# Patient Record
Sex: MALE | Race: WHITE | NOT HISPANIC OR LATINO
[De-identification: names, ages, dates, MRNs, and addresses within clinical notes are randomized per-mention and may not be internally consistent; named-entity substitution may affect disease eponyms.]

---

## 2017-04-24 ENCOUNTER — RX RENEWAL (OUTPATIENT)
Age: 32
End: 2017-04-24

## 2017-04-24 DIAGNOSIS — J30.2 OTHER SEASONAL ALLERGIC RHINITIS: ICD-10-CM

## 2019-02-01 ENCOUNTER — OUTPATIENT (OUTPATIENT)
Dept: OUTPATIENT SERVICES | Facility: HOSPITAL | Age: 34
LOS: 1 days | End: 2019-02-01
Payer: MEDICAID

## 2019-02-01 PROCEDURE — G9001: CPT

## 2019-02-05 ENCOUNTER — INPATIENT (INPATIENT)
Facility: HOSPITAL | Age: 34
LOS: 1 days | Discharge: HOME | End: 2019-02-07
Attending: SURGERY | Admitting: SURGERY
Payer: COMMERCIAL

## 2019-02-05 VITALS
HEART RATE: 110 BPM | RESPIRATION RATE: 16 BRPM | TEMPERATURE: 97 F | DIASTOLIC BLOOD PRESSURE: 78 MMHG | OXYGEN SATURATION: 99 % | WEIGHT: 139.99 LBS | SYSTOLIC BLOOD PRESSURE: 133 MMHG

## 2019-02-05 PROCEDURE — 99291 CRITICAL CARE FIRST HOUR: CPT

## 2019-02-05 PROCEDURE — 72131 CT LUMBAR SPINE W/O DYE: CPT | Mod: 26

## 2019-02-05 RX ORDER — LIDOCAINE 4 G/100G
1 CREAM TOPICAL ONCE
Qty: 0 | Refills: 0 | Status: COMPLETED | OUTPATIENT
Start: 2019-02-05 | End: 2019-02-05

## 2019-02-05 RX ORDER — KETOROLAC TROMETHAMINE 30 MG/ML
30 SYRINGE (ML) INJECTION ONCE
Qty: 0 | Refills: 0 | Status: DISCONTINUED | OUTPATIENT
Start: 2019-02-05 | End: 2019-02-05

## 2019-02-05 RX ADMIN — Medication 30 MILLIGRAM(S): at 22:46

## 2019-02-05 RX ADMIN — Medication 30 MILLIGRAM(S): at 23:15

## 2019-02-05 RX ADMIN — LIDOCAINE 1 PATCH: 4 CREAM TOPICAL at 23:12

## 2019-02-05 NOTE — ED ADULT NURSE NOTE - NSIMPLEMENTINTERV_GEN_ALL_ED
Implemented All Universal Safety Interventions:  [place] to call system. Call bell, personal items and telephone within reach. Instruct patient to call for assistance. Room bathroom lighting operational. Non-slip footwear when patient is off stretcher. Physically safe environment: no spills, clutter or unnecessary equipment. Stretcher in lowest position, wheels locked, appropriate side rails in place.

## 2019-02-05 NOTE — ED PROVIDER NOTE - CRITICAL CARE PROVIDED
interpretation of diagnostic studies/documentation/consultation with other physicians/direct patient care (not related to procedure)
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
## 2019-02-05 NOTE — ED PROVIDER NOTE - CONSTITUTIONAL, MLM
normal... Adult white male.  Well appearing, well nourished, awake, alert, oriented to person, place, time/situation and in mild distress from pain.
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
## 2019-02-06 DIAGNOSIS — F19.10 OTHER PSYCHOACTIVE SUBSTANCE ABUSE, UNCOMPLICATED: ICD-10-CM

## 2019-02-06 DIAGNOSIS — Z90.49 ACQUIRED ABSENCE OF OTHER SPECIFIED PARTS OF DIGESTIVE TRACT: Chronic | ICD-10-CM

## 2019-02-06 DIAGNOSIS — S32.001A STABLE BURST FRACTURE OF UNSPECIFIED LUMBAR VERTEBRA, INITIAL ENCOUNTER FOR CLOSED FRACTURE: ICD-10-CM

## 2019-02-06 LAB
ALBUMIN SERPL ELPH-MCNC: 3.7 G/DL — SIGNIFICANT CHANGE UP (ref 3.3–5)
ALP SERPL-CCNC: 51 U/L — SIGNIFICANT CHANGE UP (ref 40–120)
ALT FLD-CCNC: 22 U/L — SIGNIFICANT CHANGE UP (ref 12–78)
ANION GAP SERPL CALC-SCNC: 8 MMOL/L — SIGNIFICANT CHANGE UP (ref 5–17)
APTT BLD: 30.4 SEC — SIGNIFICANT CHANGE UP (ref 27.5–36.3)
AST SERPL-CCNC: 18 U/L — SIGNIFICANT CHANGE UP (ref 15–37)
BASOPHILS # BLD AUTO: 0.01 K/UL — SIGNIFICANT CHANGE UP (ref 0–0.2)
BASOPHILS NFR BLD AUTO: 0.1 % — SIGNIFICANT CHANGE UP (ref 0–2)
BILIRUB SERPL-MCNC: 0.3 MG/DL — SIGNIFICANT CHANGE UP (ref 0.2–1.2)
BLD GP AB SCN SERPL QL: SIGNIFICANT CHANGE UP
BUN SERPL-MCNC: 15 MG/DL — SIGNIFICANT CHANGE UP (ref 7–23)
CALCIUM SERPL-MCNC: 8.5 MG/DL — SIGNIFICANT CHANGE UP (ref 8.5–10.1)
CHLORIDE SERPL-SCNC: 112 MMOL/L — HIGH (ref 96–108)
CO2 SERPL-SCNC: 25 MMOL/L — SIGNIFICANT CHANGE UP (ref 22–31)
CREAT SERPL-MCNC: 0.98 MG/DL — SIGNIFICANT CHANGE UP (ref 0.5–1.3)
EOSINOPHIL # BLD AUTO: 0.08 K/UL — SIGNIFICANT CHANGE UP (ref 0–0.5)
EOSINOPHIL NFR BLD AUTO: 0.9 % — SIGNIFICANT CHANGE UP (ref 0–6)
GLUCOSE SERPL-MCNC: 107 MG/DL — HIGH (ref 70–99)
HCT VFR BLD CALC: 38.1 % — LOW (ref 39–50)
HGB BLD-MCNC: 12.9 G/DL — LOW (ref 13–17)
IMM GRANULOCYTES NFR BLD AUTO: 0.4 % — SIGNIFICANT CHANGE UP (ref 0–1.5)
INR BLD: 1.27 RATIO — HIGH (ref 0.88–1.16)
LYMPHOCYTES # BLD AUTO: 1.24 K/UL — SIGNIFICANT CHANGE UP (ref 1–3.3)
LYMPHOCYTES # BLD AUTO: 13.9 % — SIGNIFICANT CHANGE UP (ref 13–44)
MCHC RBC-ENTMCNC: 31.3 PG — SIGNIFICANT CHANGE UP (ref 27–34)
MCHC RBC-ENTMCNC: 33.9 GM/DL — SIGNIFICANT CHANGE UP (ref 32–36)
MCV RBC AUTO: 92.5 FL — SIGNIFICANT CHANGE UP (ref 80–100)
MONOCYTES # BLD AUTO: 0.52 K/UL — SIGNIFICANT CHANGE UP (ref 0–0.9)
MONOCYTES NFR BLD AUTO: 5.8 % — SIGNIFICANT CHANGE UP (ref 2–14)
NEUTROPHILS # BLD AUTO: 7.06 K/UL — SIGNIFICANT CHANGE UP (ref 1.8–7.4)
NEUTROPHILS NFR BLD AUTO: 78.9 % — HIGH (ref 43–77)
NRBC # BLD: 0 /100 WBCS — SIGNIFICANT CHANGE UP (ref 0–0)
PLATELET # BLD AUTO: 246 K/UL — SIGNIFICANT CHANGE UP (ref 150–400)
POTASSIUM SERPL-MCNC: 3.2 MMOL/L — LOW (ref 3.5–5.3)
POTASSIUM SERPL-SCNC: 3.2 MMOL/L — LOW (ref 3.5–5.3)
PROT SERPL-MCNC: 6.6 GM/DL — SIGNIFICANT CHANGE UP (ref 6–8.3)
PROTHROM AB SERPL-ACNC: 14.2 SEC — HIGH (ref 10–12.9)
RBC # BLD: 4.12 M/UL — LOW (ref 4.2–5.8)
RBC # FLD: 12.3 % — SIGNIFICANT CHANGE UP (ref 10.3–14.5)
SODIUM SERPL-SCNC: 145 MMOL/L — SIGNIFICANT CHANGE UP (ref 135–145)
TYPE + AB SCN PNL BLD: SIGNIFICANT CHANGE UP
WBC # BLD: 8.95 K/UL — SIGNIFICANT CHANGE UP (ref 3.8–10.5)
WBC # FLD AUTO: 8.95 K/UL — SIGNIFICANT CHANGE UP (ref 3.8–10.5)

## 2019-02-06 PROCEDURE — 74177 CT ABD & PELVIS W/CONTRAST: CPT | Mod: 26

## 2019-02-06 PROCEDURE — 99252 IP/OBS CONSLTJ NEW/EST SF 35: CPT

## 2019-02-06 PROCEDURE — 72148 MRI LUMBAR SPINE W/O DYE: CPT | Mod: 26

## 2019-02-06 PROCEDURE — 99223 1ST HOSP IP/OBS HIGH 75: CPT

## 2019-02-06 PROCEDURE — 71260 CT THORAX DX C+: CPT | Mod: 26

## 2019-02-06 RX ORDER — IBUPROFEN 200 MG
400 TABLET ORAL EVERY 4 HOURS
Qty: 0 | Refills: 0 | Status: DISCONTINUED | OUTPATIENT
Start: 2019-02-06 | End: 2019-02-07

## 2019-02-06 RX ORDER — HYDROMORPHONE HYDROCHLORIDE 2 MG/ML
1 INJECTION INTRAMUSCULAR; INTRAVENOUS; SUBCUTANEOUS EVERY 4 HOURS
Qty: 0 | Refills: 0 | Status: DISCONTINUED | OUTPATIENT
Start: 2019-02-06 | End: 2019-02-06

## 2019-02-06 RX ORDER — PANTOPRAZOLE SODIUM 20 MG/1
40 TABLET, DELAYED RELEASE ORAL DAILY
Qty: 0 | Refills: 0 | Status: DISCONTINUED | OUTPATIENT
Start: 2019-02-06 | End: 2019-02-07

## 2019-02-06 RX ORDER — SODIUM CHLORIDE 9 MG/ML
3 INJECTION INTRAMUSCULAR; INTRAVENOUS; SUBCUTANEOUS EVERY 8 HOURS
Qty: 0 | Refills: 0 | Status: DISCONTINUED | OUTPATIENT
Start: 2019-02-06 | End: 2019-02-07

## 2019-02-06 RX ORDER — SODIUM CHLORIDE 9 MG/ML
3 INJECTION INTRAMUSCULAR; INTRAVENOUS; SUBCUTANEOUS ONCE
Qty: 0 | Refills: 0 | Status: COMPLETED | OUTPATIENT
Start: 2019-02-06 | End: 2019-02-06

## 2019-02-06 RX ORDER — SODIUM CHLORIDE 9 MG/ML
1000 INJECTION INTRAMUSCULAR; INTRAVENOUS; SUBCUTANEOUS
Qty: 0 | Refills: 0 | Status: DISCONTINUED | OUTPATIENT
Start: 2019-02-06 | End: 2019-02-06

## 2019-02-06 RX ORDER — ACETAMINOPHEN 500 MG
1000 TABLET ORAL ONCE
Qty: 0 | Refills: 0 | Status: COMPLETED | OUTPATIENT
Start: 2019-02-06 | End: 2019-02-06

## 2019-02-06 RX ORDER — MORPHINE SULFATE 50 MG/1
6 CAPSULE, EXTENDED RELEASE ORAL ONCE
Qty: 0 | Refills: 0 | Status: DISCONTINUED | OUTPATIENT
Start: 2019-02-06 | End: 2019-02-06

## 2019-02-06 RX ORDER — HEPARIN SODIUM 5000 [USP'U]/ML
5000 INJECTION INTRAVENOUS; SUBCUTANEOUS EVERY 8 HOURS
Qty: 0 | Refills: 0 | Status: DISCONTINUED | OUTPATIENT
Start: 2019-02-06 | End: 2019-02-07

## 2019-02-06 RX ORDER — BUPRENORPHINE AND NALOXONE 2; .5 MG/1; MG/1
0 TABLET SUBLINGUAL
Qty: 0 | Refills: 0 | COMMUNITY

## 2019-02-06 RX ADMIN — SODIUM CHLORIDE 3 MILLILITER(S): 9 INJECTION INTRAMUSCULAR; INTRAVENOUS; SUBCUTANEOUS at 00:34

## 2019-02-06 RX ADMIN — MORPHINE SULFATE 6 MILLIGRAM(S): 50 CAPSULE, EXTENDED RELEASE ORAL at 00:45

## 2019-02-06 RX ADMIN — Medication 400 MILLIGRAM(S): at 18:07

## 2019-02-06 RX ADMIN — HYDROMORPHONE HYDROCHLORIDE 1 MILLIGRAM(S): 2 INJECTION INTRAMUSCULAR; INTRAVENOUS; SUBCUTANEOUS at 02:41

## 2019-02-06 RX ADMIN — Medication 400 MILLIGRAM(S): at 15:10

## 2019-02-06 RX ADMIN — MORPHINE SULFATE 6 MILLIGRAM(S): 50 CAPSULE, EXTENDED RELEASE ORAL at 00:22

## 2019-02-06 RX ADMIN — Medication 400 MILLIGRAM(S): at 21:42

## 2019-02-06 RX ADMIN — HYDROMORPHONE HYDROCHLORIDE 1 MILLIGRAM(S): 2 INJECTION INTRAMUSCULAR; INTRAVENOUS; SUBCUTANEOUS at 07:47

## 2019-02-06 RX ADMIN — HEPARIN SODIUM 5000 UNIT(S): 5000 INJECTION INTRAVENOUS; SUBCUTANEOUS at 21:42

## 2019-02-06 RX ADMIN — SODIUM CHLORIDE 3 MILLILITER(S): 9 INJECTION INTRAMUSCULAR; INTRAVENOUS; SUBCUTANEOUS at 14:51

## 2019-02-06 RX ADMIN — SODIUM CHLORIDE 3 MILLILITER(S): 9 INJECTION INTRAMUSCULAR; INTRAVENOUS; SUBCUTANEOUS at 21:06

## 2019-02-06 RX ADMIN — LIDOCAINE 1 PATCH: 4 CREAM TOPICAL at 16:10

## 2019-02-06 RX ADMIN — LIDOCAINE 1 PATCH: 4 CREAM TOPICAL at 08:00

## 2019-02-06 RX ADMIN — Medication 400 MILLIGRAM(S): at 18:10

## 2019-02-06 RX ADMIN — Medication 1000 MILLIGRAM(S): at 12:00

## 2019-02-06 RX ADMIN — SODIUM CHLORIDE 125 MILLILITER(S): 9 INJECTION INTRAMUSCULAR; INTRAVENOUS; SUBCUTANEOUS at 02:37

## 2019-02-06 RX ADMIN — Medication 400 MILLIGRAM(S): at 14:38

## 2019-02-06 RX ADMIN — Medication 400 MILLIGRAM(S): at 11:34

## 2019-02-06 RX ADMIN — HYDROMORPHONE HYDROCHLORIDE 1 MILLIGRAM(S): 2 INJECTION INTRAMUSCULAR; INTRAVENOUS; SUBCUTANEOUS at 08:00

## 2019-02-06 RX ADMIN — Medication 400 MILLIGRAM(S): at 21:46
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
## 2019-02-06 NOTE — H&P ADULT - NSHPPHYSICALEXAM_GEN_ALL_CORE
Vital Signs Last 24 Hrs  T(C): 36.3 (05 Feb 2019 21:40), Max: 36.3 (05 Feb 2019 21:40)  T(F): 97.4 (05 Feb 2019 21:40), Max: 97.4 (05 Feb 2019 21:40)  HR: 69 (06 Feb 2019 00:20) (69 - 110)  BP: 129/72 (06 Feb 2019 00:20) (129/72 - 133/78)  BP(mean): --  RR: 17 (06 Feb 2019 00:20) (16 - 17)  SpO2: 96% (06 Feb 2019 00:20) (96% - 99%)    PHYSICAL EXAM:    Constitutional: NAD, GCS: 15/15  AOX3  Eyes:  WNL  ENMT:  WNL mid forehead small contusion. superfacial abrasion left eye brow.  Neck:  WNL, non tender  Back: Non tender  Respiratory: CTABL  Cardiovascular:  S1+S2+0  Gastrointestinal: Soft, ND , NT, rectal tone intact  Genitourinary:  WNL  Extremities: NV intact  Vascular:  Intact  Neurological: RLE 3/5, lLE 4/5 decreased sensations RLE as compare to left , CN intact.  Skin: WNL  Musculoskeletal: lower back tenderness.  Psychiatric: Grossly WNL

## 2019-02-07 ENCOUNTER — TRANSCRIPTION ENCOUNTER (OUTPATIENT)
Age: 34
End: 2019-02-07

## 2019-02-07 VITALS
RESPIRATION RATE: 16 BRPM | SYSTOLIC BLOOD PRESSURE: 123 MMHG | HEART RATE: 58 BPM | OXYGEN SATURATION: 97 % | TEMPERATURE: 98 F | DIASTOLIC BLOOD PRESSURE: 61 MMHG

## 2019-02-07 PROCEDURE — 99232 SBSQ HOSP IP/OBS MODERATE 35: CPT

## 2019-02-07 PROCEDURE — 99239 HOSP IP/OBS DSCHRG MGMT >30: CPT

## 2019-02-07 PROCEDURE — 72100 X-RAY EXAM L-S SPINE 2/3 VWS: CPT | Mod: 26

## 2019-02-07 RX ORDER — IBUPROFEN 200 MG
1 TABLET ORAL
Qty: 0 | Refills: 0 | COMMUNITY
Start: 2019-02-07

## 2019-02-07 RX ORDER — ACETAMINOPHEN 500 MG
1000 TABLET ORAL ONCE
Qty: 0 | Refills: 0 | Status: COMPLETED | OUTPATIENT
Start: 2019-02-07 | End: 2019-02-07

## 2019-02-07 RX ADMIN — Medication 400 MILLIGRAM(S): at 09:56

## 2019-02-07 RX ADMIN — Medication 400 MILLIGRAM(S): at 16:35

## 2019-02-07 RX ADMIN — Medication 400 MILLIGRAM(S): at 05:46

## 2019-02-07 RX ADMIN — Medication 1000 MILLIGRAM(S): at 06:44

## 2019-02-07 RX ADMIN — SODIUM CHLORIDE 3 MILLILITER(S): 9 INJECTION INTRAMUSCULAR; INTRAVENOUS; SUBCUTANEOUS at 05:46

## 2019-02-07 RX ADMIN — Medication 400 MILLIGRAM(S): at 06:42

## 2019-02-07 RX ADMIN — HEPARIN SODIUM 5000 UNIT(S): 5000 INJECTION INTRAVENOUS; SUBCUTANEOUS at 14:15

## 2019-02-07 RX ADMIN — Medication 400 MILLIGRAM(S): at 11:03

## 2019-02-07 RX ADMIN — SODIUM CHLORIDE 3 MILLILITER(S): 9 INJECTION INTRAMUSCULAR; INTRAVENOUS; SUBCUTANEOUS at 13:37

## 2019-02-07 RX ADMIN — PANTOPRAZOLE SODIUM 40 MILLIGRAM(S): 20 TABLET, DELAYED RELEASE ORAL at 11:35

## 2019-02-07 RX ADMIN — HEPARIN SODIUM 5000 UNIT(S): 5000 INJECTION INTRAVENOUS; SUBCUTANEOUS at 05:46

## 2019-02-07 RX ADMIN — Medication 400 MILLIGRAM(S): at 14:15

## 2019-02-07 RX ADMIN — HYDROMORPHONE HYDROCHLORIDE 1 MILLIGRAM(S): 2 INJECTION INTRAMUSCULAR; INTRAVENOUS; SUBCUTANEOUS at 07:34

## 2019-02-07 NOTE — DISCHARGE NOTE ADULT - PATIENT PORTAL LINK FT
You can access the [facility] Patient Portal, offered by [facility], by registering with the following website: [email or web address]
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
## 2019-02-07 NOTE — DISCHARGE NOTE ADULT - PLAN OF CARE
pain control, neurologic stability Please follow up with spine surgeon Dr Lee as outpatient for continued monitoring and care for lumbar spine pathology. please seek immediate medical attention for changes to sensation or motor function, chest pain, shortness of breath, or any adverse changes to health. Pt to wear TLSO brace as instructed by spine surgery

## 2019-02-07 NOTE — PHYSICAL THERAPY INITIAL EVALUATION ADULT - MODALITIES TREATMENT COMMENTS
xiomara tx well with no c/o during transfer, gait and therex training. brace donned with CgAx1, Spinal precautions reviewed.

## 2019-02-07 NOTE — PROGRESS NOTE ADULT - SUBJECTIVE AND OBJECTIVE BOX
NEUROSURGERY PA FOLLOW-UP  NOTE:    Pt had standing x-ray to evaluate L1 compression fracture   Fracture is stable on x-ray  No neurosurgical intervention required  OK for discharge from a neurosurgical perspective   Follow up in the office in 6 weeks  Pt should wear brace at all times while out of bed except for showers.     Dr. Lee has reviewed the imaging and agrees with the plan
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
## 2019-02-07 NOTE — DISCHARGE NOTE ADULT - CARE PROVIDER_API CALL
Kane Lee; PhD)  Neurosurgery  [address], [address]nd [place] [address]  Phone: [phone]  Fax: [phone]  Follow Up Time:     Addiction/pain management specialist,   Phone: (   )    -  Fax: (   )    -  Follow Up Time:
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
## 2019-02-07 NOTE — DISCHARGE NOTE ADULT - PROVIDER TOKENS
PROVIDER:[TOKEN:[[id number]]],FREE:[LAST:[Addiction/pain management specialist],PHONE:[(   )    -],FAX:[(   )    -]]
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
## 2019-02-07 NOTE — DISCHARGE NOTE ADULT - ADDITIONAL INSTRUCTIONS
Please follow up with spine surgeon Dr Lee as outpatient for continued monitoring and care for lumbar spine pathology. please seek immediate medical attention for changes to sensation or motor function, chest pain, shortness of breath, or any adverse changes to health

## 2019-02-07 NOTE — DISCHARGE NOTE ADULT - CARE PROVIDERS DIRECT ADDRESSES
,[email or web address]
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
## 2019-02-09 PROBLEM — F19.10 OTHER PSYCHOACTIVE SUBSTANCE ABUSE, UNCOMPLICATED: Chronic | Status: ACTIVE | Noted: 2019-02-06

## 2019-02-11 ENCOUNTER — RX RENEWAL (OUTPATIENT)
Age: 34
End: 2019-02-11

## 2019-02-11 DIAGNOSIS — M54.5 LOW BACK PAIN: ICD-10-CM
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
## 2019-02-11 DIAGNOSIS — F19.10 OTHER PSYCHOACTIVE SUBSTANCE ABUSE, UNCOMPLICATED: ICD-10-CM

## 2019-02-11 DIAGNOSIS — S32.018A OTHER FRACTURE OF FIRST LUMBAR VERTEBRA, INITIAL ENCOUNTER FOR CLOSED FRACTURE: ICD-10-CM
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
## 2019-02-11 DIAGNOSIS — F17.210 NICOTINE DEPENDENCE, CIGARETTES, UNCOMPLICATED: ICD-10-CM

## 2019-02-11 DIAGNOSIS — Z71.89 OTHER SPECIFIED COUNSELING: ICD-10-CM

## 2019-03-20 ENCOUNTER — FORM ENCOUNTER (OUTPATIENT)
Age: 34
End: 2019-03-20

## 2019-03-21 ENCOUNTER — APPOINTMENT (OUTPATIENT)
Dept: RADIOLOGY | Facility: CLINIC | Age: 34
End: 2019-03-21
Payer: MEDICAID

## 2019-03-21 ENCOUNTER — OUTPATIENT (OUTPATIENT)
Dept: OUTPATIENT SERVICES | Facility: HOSPITAL | Age: 34
LOS: 1 days | End: 2019-03-21
Payer: MEDICAID

## 2019-03-21 ENCOUNTER — APPOINTMENT (OUTPATIENT)
Dept: NEUROSURGERY | Facility: CLINIC | Age: 34
End: 2019-03-21
Payer: COMMERCIAL

## 2019-03-21 VITALS
HEIGHT: 69 IN | RESPIRATION RATE: 16 BRPM | DIASTOLIC BLOOD PRESSURE: 82 MMHG | BODY MASS INDEX: 24.59 KG/M2 | HEART RATE: 95 BPM | SYSTOLIC BLOOD PRESSURE: 138 MMHG | TEMPERATURE: 98.4 F | WEIGHT: 166 LBS

## 2019-03-21 DIAGNOSIS — R51 HEADACHE: ICD-10-CM

## 2019-03-21 DIAGNOSIS — Z00.8 ENCOUNTER FOR OTHER GENERAL EXAMINATION: ICD-10-CM

## 2019-03-21 DIAGNOSIS — R42 DIZZINESS AND GIDDINESS: ICD-10-CM

## 2019-03-21 DIAGNOSIS — Z90.49 ACQUIRED ABSENCE OF OTHER SPECIFIED PARTS OF DIGESTIVE TRACT: Chronic | ICD-10-CM

## 2019-03-21 PROCEDURE — 72100 X-RAY EXAM L-S SPINE 2/3 VWS: CPT

## 2019-03-21 PROCEDURE — 72100 X-RAY EXAM L-S SPINE 2/3 VWS: CPT | Mod: 26

## 2019-03-21 PROCEDURE — 99214 OFFICE O/P EST MOD 30 MIN: CPT

## 2019-03-21 RX ORDER — MONTELUKAST 10 MG/1
10 TABLET, FILM COATED ORAL
Qty: 90 | Refills: 0 | Status: DISCONTINUED | COMMUNITY
Start: 2017-04-24 | End: 2019-03-21
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
## 2019-04-08 ENCOUNTER — OUTPATIENT (OUTPATIENT)
Dept: OUTPATIENT SERVICES | Facility: HOSPITAL | Age: 34
LOS: 1 days | End: 2019-04-08
Payer: COMMERCIAL

## 2019-04-08 ENCOUNTER — APPOINTMENT (OUTPATIENT)
Dept: CT IMAGING | Facility: CLINIC | Age: 34
End: 2019-04-08
Payer: COMMERCIAL

## 2019-04-08 ENCOUNTER — OUTPATIENT (OUTPATIENT)
Dept: OUTPATIENT SERVICES | Facility: HOSPITAL | Age: 34
LOS: 1 days | End: 2019-04-08
Payer: MEDICAID

## 2019-04-08 DIAGNOSIS — Z00.8 ENCOUNTER FOR OTHER GENERAL EXAMINATION: ICD-10-CM

## 2019-04-08 DIAGNOSIS — Z90.49 ACQUIRED ABSENCE OF OTHER SPECIFIED PARTS OF DIGESTIVE TRACT: Chronic | ICD-10-CM

## 2019-04-08 PROCEDURE — 70490 CT SOFT TISSUE NECK W/O DYE: CPT | Mod: 26

## 2019-04-08 PROCEDURE — 70490 CT SOFT TISSUE NECK W/O DYE: CPT

## 2019-04-08 PROCEDURE — 70450 CT HEAD/BRAIN W/O DYE: CPT

## 2019-04-08 PROCEDURE — 70450 CT HEAD/BRAIN W/O DYE: CPT | Mod: 26

## 2019-04-20 ENCOUNTER — TRANSCRIPTION ENCOUNTER (OUTPATIENT)
Age: 34
End: 2019-04-20

## 2019-05-02 ENCOUNTER — APPOINTMENT (OUTPATIENT)
Age: 34
End: 2019-05-02
Payer: COMMERCIAL

## 2019-05-02 VITALS
WEIGHT: 166 LBS | HEART RATE: 64 BPM | RESPIRATION RATE: 15 BRPM | HEIGHT: 69 IN | TEMPERATURE: 97.4 F | DIASTOLIC BLOOD PRESSURE: 82 MMHG | BODY MASS INDEX: 24.59 KG/M2 | SYSTOLIC BLOOD PRESSURE: 130 MMHG

## 2019-05-02 DIAGNOSIS — S32.009A UNSPECIFIED FRACTURE OF UNSPECIFIED LUMBAR VERTEBRA, INITIAL ENCOUNTER FOR CLOSED FRACTURE: ICD-10-CM

## 2019-05-02 PROCEDURE — 99213 OFFICE O/P EST LOW 20 MIN: CPT

## 2019-05-06 ENCOUNTER — APPOINTMENT (OUTPATIENT)
Dept: OTOLARYNGOLOGY | Facility: CLINIC | Age: 34
End: 2019-05-06
Payer: MEDICAID

## 2019-05-06 VITALS
SYSTOLIC BLOOD PRESSURE: 130 MMHG | HEIGHT: 69 IN | BODY MASS INDEX: 25.33 KG/M2 | WEIGHT: 171 LBS | HEART RATE: 72 BPM | DIASTOLIC BLOOD PRESSURE: 87 MMHG

## 2019-05-06 DIAGNOSIS — Z87.81 PERSONAL HISTORY OF (HEALED) TRAUMATIC FRACTURE: ICD-10-CM

## 2019-05-06 DIAGNOSIS — Z80.9 FAMILY HISTORY OF MALIGNANT NEOPLASM, UNSPECIFIED: ICD-10-CM

## 2019-05-06 DIAGNOSIS — J30.9 ALLERGIC RHINITIS, UNSPECIFIED: ICD-10-CM

## 2019-05-06 DIAGNOSIS — Z83.3 FAMILY HISTORY OF DIABETES MELLITUS: ICD-10-CM

## 2019-05-06 DIAGNOSIS — R59.0 LOCALIZED ENLARGED LYMPH NODES: ICD-10-CM

## 2019-05-06 DIAGNOSIS — F17.210 NICOTINE DEPENDENCE, CIGARETTES, UNCOMPLICATED: ICD-10-CM

## 2019-05-06 DIAGNOSIS — Z83.79 FAMILY HISTORY OF OTHER DISEASES OF THE DIGESTIVE SYSTEM: ICD-10-CM

## 2019-05-06 DIAGNOSIS — J35.2 HYPERTROPHY OF ADENOIDS: ICD-10-CM

## 2019-05-06 DIAGNOSIS — Z87.09 PERSONAL HISTORY OF OTHER DISEASES OF THE RESPIRATORY SYSTEM: ICD-10-CM

## 2019-05-06 DIAGNOSIS — Z83.49 FAMILY HISTORY OF OTHER ENDOCRINE, NUTRITIONAL AND METABOLIC DISEASES: ICD-10-CM

## 2019-05-06 DIAGNOSIS — R59.1 GENERALIZED ENLARGED LYMPH NODES: ICD-10-CM

## 2019-05-06 PROCEDURE — 99204 OFFICE O/P NEW MOD 45 MIN: CPT | Mod: 25

## 2019-05-06 PROCEDURE — 31575 DIAGNOSTIC LARYNGOSCOPY: CPT

## 2019-05-06 RX ORDER — BUPRENORPHINE HYDROCHLORIDE, NALOXONE HYDROCHLORIDE 8; 2 MG/1; MG/1
8-2 FILM, SOLUBLE BUCCAL; SUBLINGUAL
Refills: 0 | Status: ACTIVE | COMMUNITY

## 2019-05-06 RX ORDER — IBUPROFEN 600 MG/1
600 TABLET, FILM COATED ORAL 4 TIMES DAILY
Qty: 60 | Refills: 1 | Status: ACTIVE | COMMUNITY

## 2019-05-06 RX ORDER — SERTRALINE HYDROCHLORIDE 50 MG/1
50 TABLET, FILM COATED ORAL
Refills: 0 | Status: ACTIVE | COMMUNITY

## 2019-05-06 NOTE — PROCEDURE
[de-identified] : Scope # \par Topical anesthesia with viscous xylocaine 2% is applied to the anterior nares.\par A flexible fiberoptic laryngoscope is than introduced through the nares.\par The nasopharynx is clear with normal appearing lymph/adenoid tissue 1-2 plus.\par The posterior pharyngeal wall is unremarkable.\par The tongue base and vallecula are unremarkable.\par The supraglottic larynx is within normal limits.\par Both vocal cords are fully mobile with no nodule, polyp or other lesion present.\par There is no edema or erythema overlying the arytenoid cartilages or the inter-arytenoid space.\par The subglottic space is clear.\par The voice has a  normal quality.\par

## 2019-05-06 NOTE — PHYSICAL EXAM
[de-identified] : left posterior  occipital 1 cm soft mobile node, 1 plus soft bilateral fatimah nodes [Normal] : no abnormal secretions [Laryngoscopy Performed] : laryngoscopy was performed, see procedure section for findings

## 2019-05-06 NOTE — REASON FOR VISIT
[Initial Consultation] : an initial consultation for [FreeTextEntry2] : growth on back of neck /  enlarged lymph
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
## 2019-05-06 NOTE — CONSULT LETTER
[FreeTextEntry2] : blade olivarez [FreeTextEntry1] : Dear Dr. MARTIR ETIENNE,\par \par Thank you for your kind referral. Please refer to my enclosed office notes for MCKINLEY HOLLAND . If there are any questions free to contact me.\par  [FreeTextEntry3] : Deven Julio MD, FACS\par
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
## 2019-05-06 NOTE — REVIEW OF SYSTEMS
[Seasonal Allergies] : seasonal allergies [Sneezing] : sneezing [Post Nasal Drip] : post nasal drip [Dizziness] : dizziness [Nasal Congestion] : nasal congestion [Recurrent Sinus Infections] : recurrent sinus infections [Throat Clearing] : throat clearing [Sinus Pain] : sinus pain [Sinus Pressure] : sinus pressure [Throat Dryness] : throat dryness [Throat Pain] : throat pain [Throat Itching] : throat itching [Shortness Of Breath] : shortness of breath [Depression] : depression [Easy Bruising] : tendency for easy bruising [Cough] : cough [Swollen Glands] : swollen glands [Negative] : Endocrine [FreeTextEntry1] : headache  fatigue , joint aches  reflux  sweating at night   muscle aches

## 2019-05-09 PROBLEM — S32.009A LUMBAR VERTEBRAL FRACTURE: Status: RESOLVED | Noted: 2019-02-11 | Resolved: 2019-05-09
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
## 2019-07-08 ENCOUNTER — APPOINTMENT (OUTPATIENT)
Dept: OTOLARYNGOLOGY | Facility: CLINIC | Age: 34
End: 2019-07-08

## 2019-12-04 NOTE — DISCHARGE NOTE ADULT - ADMISSION DATE +STARTOFVISITDATE
Statement Selected Topical Clindamycin Pregnancy And Lactation Text: This medication is Pregnancy Category B and is considered safe during pregnancy. It is unknown if it is excreted in breast milk.

## 2019-12-22 ENCOUNTER — TRANSCRIPTION ENCOUNTER (OUTPATIENT)
Age: 34
End: 2019-12-22

## 2020-06-03 NOTE — PHYSICAL THERAPY INITIAL EVALUATION ADULT - LEVEL OF INDEPENDENCE: SIT/STAND, REHAB EVAL
50 yo patient here for abdominal pain follow up. She has been c/o chronic and persistent diffuse abdominal pain w/ p-prandial abdominal distention, bloating and gas. Has had occasional WALE sxs w/o dysphagia. Has p-prandial nausea w/o emesis. Denies diarrhea nor bleeding. Has been on PPI qd - bid and eating 2 meals a day. She has Hx multiple spinal surgeries w/ chronic back pain on analgesics. Denies fever or chills. No cardiorespiarory sxs nor urologic complaints. supervision

## 2020-10-16 NOTE — ED ADULT NURSE NOTE - IS THE PATIENT ABLE TO BE SCREENED?
DC instructions reviewed with pt. Verb understanding. Pt escorted out via wheelchair to front entrance for dc home with paige. Yes

## 2021-03-08 ENCOUNTER — EMERGENCY (EMERGENCY)
Facility: HOSPITAL | Age: 36
LOS: 0 days | Discharge: HOME | End: 2021-03-08
Attending: EMERGENCY MEDICINE
Payer: COMMERCIAL

## 2021-03-08 VITALS — WEIGHT: 149.91 LBS | OXYGEN SATURATION: 98 % | TEMPERATURE: 98 F | RESPIRATION RATE: 18 BRPM | HEART RATE: 66 BPM

## 2021-03-08 DIAGNOSIS — S05.02XA INJURY OF CONJUNCTIVA AND CORNEAL ABRASION WITHOUT FOREIGN BODY, LEFT EYE, INITIAL ENCOUNTER: ICD-10-CM

## 2021-03-08 DIAGNOSIS — H57.11 OCULAR PAIN, RIGHT EYE: ICD-10-CM

## 2021-03-08 DIAGNOSIS — F11.10 OPIOID ABUSE, UNCOMPLICATED: ICD-10-CM
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
## 2021-03-08 DIAGNOSIS — Z90.49 ACQUIRED ABSENCE OF OTHER SPECIFIED PARTS OF DIGESTIVE TRACT: Chronic | ICD-10-CM
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
## 2021-03-08 DIAGNOSIS — Z23 ENCOUNTER FOR IMMUNIZATION: ICD-10-CM

## 2021-03-08 PROCEDURE — 99283 EMERGENCY DEPT VISIT LOW MDM: CPT

## 2021-03-08 PROCEDURE — 90715 TDAP VACCINE 7 YRS/> IM: CPT

## 2021-03-08 PROCEDURE — 90471 IMMUNIZATION ADMIN: CPT

## 2021-03-08 PROCEDURE — 99283 EMERGENCY DEPT VISIT LOW MDM: CPT | Mod: 25

## 2021-03-08 RX ORDER — ERYTHROMYCIN BASE 5 MG/GRAM
1 OINTMENT (GRAM) OPHTHALMIC (EYE) ONCE
Refills: 0 | Status: COMPLETED | OUTPATIENT
Start: 2021-03-08 | End: 2021-03-08

## 2021-03-08 RX ORDER — TETANUS TOXOID, REDUCED DIPHTHERIA TOXOID AND ACELLULAR PERTUSSIS VACCINE, ADSORBED 5; 2.5; 8; 8; 2.5 [IU]/.5ML; [IU]/.5ML; UG/.5ML; UG/.5ML; UG/.5ML
0.5 SUSPENSION INTRAMUSCULAR ONCE
Refills: 0 | Status: COMPLETED | OUTPATIENT
Start: 2021-03-08 | End: 2021-03-08

## 2021-03-08 RX ORDER — ERYTHROMYCIN BASE 5 MG/GRAM
1 OINTMENT (GRAM) OPHTHALMIC (EYE)
Qty: 1 | Refills: 0
Start: 2021-03-08 | End: 2021-03-12

## 2021-03-08 RX ADMIN — Medication 1 APPLICATION(S): at 21:12

## 2021-03-08 RX ADMIN — TETANUS TOXOID, REDUCED DIPHTHERIA TOXOID AND ACELLULAR PERTUSSIS VACCINE, ADSORBED 0.5 MILLILITER(S): 5; 2.5; 8; 8; 2.5 SUSPENSION INTRAMUSCULAR at 21:11

## 2021-03-08 NOTE — ED STATDOCS - NSFOLLOWUPINSTRUCTIONS_ED_ALL_ED_FT
Corneal Abrasion    A corneal abrasion is a scratch or injury to the clear covering over the front of your eye (cornea). Your cornea forms a clear dome that protects your eye and helps to focus your vision. Your cornea is made up of many layers. The surface layer is a single layer of cells (corneal epithelium). It is one of the most sensitive tissues in your body. A corneal abrasion can be very painful.    If a corneal abrasion is not treated, it can become infected and cause an ulcer. This can lead to scarring. A scarred cornea can affect your vision. Sometimes abrasions come back in the same area, even after the original injury has healed (recurrent erosion syndrome).     What are the causes?  This condition may be caused by:    A poke in the eye.  A gritty or irritating substance (foreign body) in the eye.  Excessive eye rubbing.  Very dry eyes.  Certain eye infections.  Contact lenses that fit poorly or are worn for a long period of time. You can also injure your cornea when putting contacts lenses in your eye or taking them out.  Eye surgery.    Sometimes, the cause is unknown.    What are the signs or symptoms?  Symptoms of this condition include:    Eye pain. The pain may get worse when your eye is open or when you move your eye.  A feeling of something stuck in your eye.  Having trouble keeping your eye open, or not being able to keep it open.  Tearing and redness.  Sensitivity to light.  Blurred vision.  Headache.    How is this diagnosed?  This condition may be diagnosed based on:    Your medical history.  Your symptoms.  An eye exam. You may work with a health care provider who specializes in diseases and conditions of the eye (ophthalmologist). Before the eye exam, numbing drops may be put into your eye. You may also have dye put in your eye with a dropper or a small paper strip. The dye makes the abrasion easy to see when your ophthalmologist examines your eye with a light. Your ophthalmologist may look at your eye through an eye scope (slit lamp).    How is this treated?  Treatment may vary depending on the cause of your condition, and it may include:    Washing out your eye.  Removing any foreign body.  Antibiotic drops or ointment to treat an infection.  Steroid drops or ointment to treat redness, irritation, or inflammation.  Pain medicine.  An eye patch to keep your eye closed.    Follow these instructions at home:  Medicines    Use eye drops or ointments as told by your eye care provider.  If you were prescribed antibiotic drops or ointment, use them as told by your eye care provider. Do not stop using the antibiotic even if you start to feel better.  Take over-the-counter and prescription medicines only as told by your eye care provider.  Do not drive or use heavy machinery while taking prescription pain medicine.    General instructions    If you have an eye patch, wear it as told by your eye care provider.  Do not drive or use machinery while wearing an eye patch. Your ability to  distances will be impaired.  Follow instructions from your eye care provider about when to remove the patch.  Ask your eye care provider whether you can use a cold, wet cloth (compress) on your eye to relieve pain.  Do not rub or touch your eye. Do not wash out your eye.  Do not wear contact lenses until your eye care provider says that this is okay.  Avoid bright light and eye strain.  Keep all follow-up visits as told by your eye care provider. This is important for preventing infection and vision loss.    Contact a health care provider if:  You continue to have eye pain and other symptoms for more than 2 days.  You develop new symptoms, such as redness, tearing, or discharge.  You have discharge that makes your eyelids stick together in the morning.  Your eye patch becomes so loose that you can blink your eye.  Symptoms return after the original abrasion has healed.    Get help right away if:  You have severe eye pain that does not get better with medicine.  You have vision loss.    Summary  A corneal abrasion is a scratch on the outer layer of the clear covering over the front of your eye (cornea).  Corneal abrasion can cause eye pain, redness, tearing, and blurred vision.  This condition is usually treated with medicine to prevent infection and scarring. You also may have to wear an eye patch to cover your eye.  Let your eye care provider know if your symptoms continue for more than 2 days.    ADDITIONAL NOTES AND INSTRUCTIONS    Please follow up with your Primary MD in 24-48 hr.  Seek immediate medical care for any new/worsening signs or symptoms.

## 2021-03-08 NOTE — ED STATDOCS - OBJECTIVE STATEMENT
36 y/o male with PMHX of opiate abuse presents to the ED c/o right eye pain s/p trauma. Pt states he was working with utility blade, which snapped and hit pt's right eye. Pt currently c/o right eye pain, reports associated photophobia. Denies loss of vision or visual changes, denies LOC, lightheadedness, or dizziness. No other injuries or complaints at this time.

## 2021-03-08 NOTE — ED STATDOCS - PATIENT PORTAL LINK FT
You can access the FollowMyHealth Patient Portal offered by [facility] by registering at the following website: [email or web address]. By joining [employer]’s FollowMyHealth portal, you will also be able to view your health information using other applications (apps) compatible with our system.

## 2021-03-08 NOTE — ED ADULT NURSE NOTE - NSSEPSISSUSPECTED_ED_A_ED
Quality 226: Preventive Care And Screening: Tobacco Use: Screening And Cessation Intervention: Patient screened for tobacco use and is an ex/non-smoker Quality 431: Preventive Care And Screening: Unhealthy Alcohol Use - Screening: Patient screened for unhealthy alcohol use using a single question and scores less than 2 times per year Detail Level: Detailed Quality 130: Documentation Of Current Medications In The Medical Record: Current Medications Documented No

## 2021-03-08 NOTE — ED ADULT NURSE NOTE - NSIMPLEMENTINTERV_GEN_ALL_ED
Implemented All Universal Safety Interventions:  [place] to call system. Call bell, personal items and telephone within reach. Instruct patient to call for assistance. Room bathroom lighting operational. Non-slip footwear when patient is off stretcher. Physically safe environment: no spills, clutter or unnecessary equipment. Stretcher in lowest position, wheels locked, appropriate side rails in place.

## 2021-03-08 NOTE — ED STATDOCS - CLINICAL SUMMARY MEDICAL DECISION MAKING FREE TEXT BOX
Tetracaine applied, will use Matt Lamp to evaluate right eye for corneal abrasion or any other injuries. Topical antibiotics, tdap shot. F/u with ophthalmologist.

## 2021-03-08 NOTE — ED STATDOCS - NS ED MD DISPO DISCHARGE CCDA
Patient/Caregiver provided printed discharge information.
I have reviewed and confirmed nurses' notes...

## 2021-03-08 NOTE — ED STATDOCS - CARE PROVIDER_API CALL
Neri Arambula  OPHTHALMOLOGY  [address] [place] [address], [address]  [place] [address]  Phone: [phone]  Fax: [phone]  Follow Up Time: 1-3 Days

## 2021-03-08 NOTE — ED ADULT TRIAGE NOTE - CHIEF COMPLAINT QUOTE
Pt. states he was working when utility blade snapped off and hit him in the eye. Pt. states foreign body is no longer in his eye. Pt. can see out of eye however cannot keep it open

## 2022-01-18 ENCOUNTER — RESULT REVIEW (OUTPATIENT)
Age: 37
End: 2022-01-18

## 2024-08-12 ENCOUNTER — EMERGENCY (EMERGENCY)
Facility: HOSPITAL | Age: 39
LOS: 0 days | Discharge: HOME | End: 2024-08-12
Attending: STUDENT IN AN ORGANIZED HEALTH CARE EDUCATION/TRAINING PROGRAM
Payer: SELF-PAY

## 2024-08-12 VITALS
HEART RATE: 60 BPM | OXYGEN SATURATION: 98 % | DIASTOLIC BLOOD PRESSURE: 52 MMHG | RESPIRATION RATE: 18 BRPM | SYSTOLIC BLOOD PRESSURE: 105 MMHG | TEMPERATURE: 98 F

## 2024-08-12 VITALS
WEIGHT: 93.04 LBS | OXYGEN SATURATION: 97 % | HEART RATE: 77 BPM | DIASTOLIC BLOOD PRESSURE: 59 MMHG | TEMPERATURE: 98 F | RESPIRATION RATE: 16 BRPM | SYSTOLIC BLOOD PRESSURE: 111 MMHG

## 2024-08-12 DIAGNOSIS — M25.552 PAIN IN LEFT HIP: ICD-10-CM

## 2024-08-12 DIAGNOSIS — S32.422A DISPLACED FRACTURE OF POSTERIOR WALL OF LEFT ACETABULUM, INITIAL ENCOUNTER FOR CLOSED FRACTURE: ICD-10-CM
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
## 2024-08-12 DIAGNOSIS — Z90.49 ACQUIRED ABSENCE OF OTHER SPECIFIED PARTS OF DIGESTIVE TRACT: Chronic | ICD-10-CM
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
## 2024-08-12 PROCEDURE — 99285 EMERGENCY DEPT VISIT HI MDM: CPT

## 2024-08-12 PROCEDURE — 72170 X-RAY EXAM OF PELVIS: CPT

## 2024-08-12 PROCEDURE — 99053 MED SERV 10PM-8AM 24 HR FAC: CPT

## 2024-08-12 PROCEDURE — 73502 X-RAY EXAM HIP UNI 2-3 VIEWS: CPT | Mod: 26,LT

## 2024-08-12 PROCEDURE — 73552 X-RAY EXAM OF FEMUR 2/>: CPT | Mod: LT

## 2024-08-12 PROCEDURE — 76376 3D RENDER W/INTRP POSTPROCES: CPT | Mod: 26

## 2024-08-12 PROCEDURE — 76376 3D RENDER W/INTRP POSTPROCES: CPT

## 2024-08-12 PROCEDURE — 97116 GAIT TRAINING THERAPY: CPT | Mod: GP

## 2024-08-12 PROCEDURE — 99284 EMERGENCY DEPT VISIT MOD MDM: CPT | Mod: 25

## 2024-08-12 PROCEDURE — 72192 CT PELVIS W/O DYE: CPT | Mod: 26,MC

## 2024-08-12 PROCEDURE — 72170 X-RAY EXAM OF PELVIS: CPT | Mod: 26

## 2024-08-12 PROCEDURE — 73502 X-RAY EXAM HIP UNI 2-3 VIEWS: CPT | Mod: LT

## 2024-08-12 PROCEDURE — 72192 CT PELVIS W/O DYE: CPT | Mod: MC

## 2024-08-12 PROCEDURE — 73552 X-RAY EXAM OF FEMUR 2/>: CPT | Mod: 26,LT

## 2024-08-12 PROCEDURE — 97161 PT EVAL LOW COMPLEX 20 MIN: CPT | Mod: GP

## 2024-08-12 NOTE — ED ADULT NURSE REASSESSMENT NOTE - NS ED NURSE REASSESS COMMENT FT1
Obtained report from MAGDIEL Gomes. Pt resting comfortably in bed. Pt has no complaints at this time. Safety measures maintained.

## 2024-08-12 NOTE — ED PROVIDER NOTE - PATIENT PORTAL LINK FT
You can access the FollowMyHealth Patient Portal offered by [facility] by registering at the following website: [email or web address]. By joining [employer]’s FollowMyHealth portal, you will also be able to view your health information using other applications (apps) compatible with our system.

## 2024-08-12 NOTE — ED PROVIDER NOTE - CLINICAL SUMMARY MEDICAL DECISION MAKING FREE TEXT BOX
Patient presents to the ER for left hip pain after injury earlier today.  Neurovascularly intact.  X-rays showing no obvious fracture however patient is having difficulty ambulating.  CT pelvis ordered and showing mildly displaced posterior acetabular fracture. Ortho consulted and recommended PT evaluation for protected weight bearing.  Consult ordered for the morning. Orhto to re-eval after PT consult.

## 2024-08-12 NOTE — ED PROVIDER NOTE - PROGRESS NOTE DETAILS
Jarvis Busch DO (Attending): Received signout from Dr. Robertson, patient with comminuted left acetabular fracture.  Patient was seen by Ortho overnight and recommended PT evaluation in the morning.  Patient ambulated with PT using crutches effectively.  Patient feels comfortable going home using crutches.  Spoke with Ortho who agrees with outpatient follow-up and patient to be nonweightbearing with crutches.  Will discharge.

## 2024-08-12 NOTE — ED PROVIDER NOTE - OBJECTIVE STATEMENT
38-year-old male with past medical history of opioid use (now on Suboxone) presents to the ER complaining of left hip pain.  Patient states that earlier this afternoon.  He attempted to jump recommends and the leg got stuck causing him to fall over.  Since then patient has had pain in the left hip.  Difficulty ambulating.  Denies loss of strength, sensation, cyanosis, paresthesias. Denies other injury.

## 2024-08-12 NOTE — PHYSICAL THERAPY INITIAL EVALUATION ADULT - ASSISTIVE DEVICE FOR TRANSFER: SIT/STAND, REHAB EVAL
Pt received notice in the mail that her surgery was denied, she is asking for a phone call back.  She has questions axillary crutches

## 2024-08-12 NOTE — ED PROVIDER NOTE - NSFOLLOWUPINSTRUCTIONS_ED_ALL_ED_FT
Fracture    A fracture is a break in one of your bones. This can occur from a variety of injuries, especially traumatic ones. Symptoms include pain, bruising, or swelling. Do not use the injured limb. If a fracture is in one of the bones below your waist, do not put weight on that limb unless instructed to do so by your healthcare provider. Crutches or a cane may have been provided. A splint or cast may have been applied by your health care provider. Make sure to keep it dry and follow up with an orthopedist as instructed.    SEEK IMMEDIATE MEDICAL CARE IF YOU HAVE ANY OF THE FOLLOWING SYMPTOMS: numbness, tingling, increasing pain, or weakness in any part of the injured limb. 1.  Please follow-up with orthopedics, phone number provided please call for an appointment.  2.  Please use the crutches whenever up and moving around as you should not put weight on your left hip.    Fracture    A fracture is a break in one of your bones. This can occur from a variety of injuries, especially traumatic ones. Symptoms include pain, bruising, or swelling. Do not use the injured limb. If a fracture is in one of the bones below your waist, do not put weight on that limb unless instructed to do so by your healthcare provider. Crutches or a cane may have been provided. A splint or cast may have been applied by your health care provider. Make sure to keep it dry and follow up with an orthopedist as instructed.    SEEK IMMEDIATE MEDICAL CARE IF YOU HAVE ANY OF THE FOLLOWING SYMPTOMS: numbness, tingling, increasing pain, or weakness in any part of the injured limb.

## 2024-08-12 NOTE — PHYSICAL THERAPY INITIAL EVALUATION ADULT - CRITERIA FOR SKILLED THERAPEUTIC INTERVENTIONS
pt with no acute care PT needs. outpatient PT as per MD./impairments found/functional limitations in following categories
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
## 2024-08-12 NOTE — PHYSICAL THERAPY INITIAL EVALUATION ADULT - PERTINENT HX OF CURRENT PROBLEM, REHAB EVAL
37yo Male independent ambulator who presents to [facility] ED w/ a c/o of left hip pain. Patient states that he was jumping over a fence and landed awkwardly on his left leg causing a sharp pain in the left hip.   Ortho recs: "protected weight bearing of LLE."    CT PEVLIS: Mildly displaced and comminuted fracture in the posterior lip of the left acetabulum.

## 2024-08-12 NOTE — ED ADULT TRIAGE NOTE - CHIEF COMPLAINT QUOTE
Pt endorsing L hip pain. Pt jumped over a fence and his L leg got caught on the fence. Denies n/t. Endorsing pain and unable to bear weight. Pt able to move L leg minimally. Pt took Ibuprofen PTA.

## 2024-08-12 NOTE — ED ADULT NURSE NOTE - NSFALLRISKINTERV_ED_ALL_ED

## 2024-08-12 NOTE — PHYSICAL THERAPY INITIAL EVALUATION ADULT - GENERAL OBSERVATIONS, REHAB EVAL
Pt seen for 30min PT Eval. Pt rec'd semi supine in bed in NAD. pt indep with all mobility, trans and amb 50ft with axiliary crutches step to pattern. Pt returned semi supine no acute care PT needs, outpatient PT as per MD.

## 2024-08-12 NOTE — ED PROVIDER NOTE - CARE PROVIDER_API CALL
Emerson Baltazar  Orthopaedic Surgery  [address] [place] [id number]  Phone: [phone]  Fax: [phone]  Follow Up Time:

## 2024-08-12 NOTE — PHYSICAL THERAPY INITIAL EVALUATION ADULT - ADDITIONAL COMMENTS
lives with family at home, 12 steps to bedroom- information from Feb 2019 lives with family at home, 12 steps to bedroom- information from Feb 2019 no steps to enter.

## 2024-08-12 NOTE — ED PROVIDER NOTE - WR ORDER DATE AND TIME 3
12-Aug-2024 01:00 DISPLAY PLAN FREE TEXT DISPLAY PLAN FREE TEXT DISPLAY PLAN FREE TEXT DISPLAY PLAN FREE TEXT DISPLAY PLAN FREE TEXT DISPLAY PLAN FREE TEXT DISPLAY PLAN FREE TEXT DISPLAY PLAN FREE TEXT

## 2024-08-12 NOTE — ED PROVIDER NOTE - PHYSICAL EXAMINATION
Const: Well appearing, NAD  Eyes: PERRL, EOM intact  CV: RRR, no murmurs, no chest wall tenderness, distal pulses intact  Resp: CTAB, normal resp effort  GI: soft, nondistended, nontender. No CVA tenderness  MSK: Full ROM, left hip tenderness to palpation.   Neuro: AOx3, GCS 15, No focal deficits  Skin: No rash, laceration or abrasion  Psych: calm, cooperative.

## 2024-08-13 ENCOUNTER — APPOINTMENT (OUTPATIENT)
Dept: ORTHOPEDIC SURGERY | Facility: CLINIC | Age: 39
End: 2024-08-13

## 2024-08-14 ENCOUNTER — APPOINTMENT (OUTPATIENT)
Dept: ORTHOPEDIC SURGERY | Facility: CLINIC | Age: 39
End: 2024-08-14
Payer: SELF-PAY

## 2024-08-14 VITALS
HEIGHT: 70 IN | SYSTOLIC BLOOD PRESSURE: 121 MMHG | WEIGHT: 150 LBS | BODY MASS INDEX: 21.47 KG/M2 | HEART RATE: 62 BPM | DIASTOLIC BLOOD PRESSURE: 79 MMHG

## 2024-08-14 DIAGNOSIS — S32.425A NONDISPLACED FRACTURE OF POSTERIOR WALL OF LEFT ACETABULUM, INITIAL ENCOUNTER FOR CLOSED FRACTURE: ICD-10-CM

## 2024-08-14 PROCEDURE — 99203 OFFICE O/P NEW LOW 30 MIN: CPT

## 2024-08-14 PROCEDURE — 27220 TREAT HIP SOCKET FRACTURE: CPT

## 2024-08-14 RX ORDER — BUPROPION HYDROCHLORIDE 300 MG/1
300 TABLET, EXTENDED RELEASE ORAL
Refills: 0 | Status: ACTIVE | COMMUNITY

## 2024-08-14 NOTE — DISCUSSION/SUMMARY
[de-identified] : 38-year-old male with a left acetabulum fracture.  Given the lack of displacement and lack of hip dislocation, no surgical intervention is needed at this time.  Would recommend continuing toe-touch weightbearing for about another month.  He can then start to progress to full weightbearing at that time.  Posterior hip precautions were recommended.  If he is still having significant symptoms in a month, he will come back with repeat x-rays and may be advanced him to a cane or physical therapy at that time.  Conservative measures of treatment include rest until asymptomatic, activity avoidance, NSAID's PRN, acetaminophen, application to ice to the area 2-3x daily for 20 minutes, with gradual return to activities.  A discussion was had with the patient regarding basic fracture care. Bony union typically requires 4-6 wks of relative rest and immobilization, and symptoms of pain/swelling typically resolve during this time. Complications of fractures can involve malunion, nonunion, and further displacement that may warrant additional treatment. To avoid these complications, it is recommended for frequent radiographic followup to confirm that the fracture is healing appropriately.   The patient was given the opportunity to ask questions and all questions were answered to their satisfaction.  Emerson Jay MD Orthopaedic Trauma Surgeon [facility]  Orthopaedic Trauma, [facility]

## 2024-08-14 NOTE — PHYSICAL EXAM
[de-identified] : Physical Exam: General: Well appearing, no acute distress, A&O Neurologic: A&Ox3, No focal deficits Head: NCAT without abrasions, lacerations, or ecchymosis to head, face, or scalp Respiratory: Equal chest wall expansion bilaterally, no accessory muscle use Lymphatic: No lymphadenopathy palpated Skin: Warm and dry Psychiatric: Normal mood and affect  Left lower extremity: SILT s/s/sp/dp/t Fires EHL/FHL/GS/TA 2+ DP/PT pulse brisk capillary refill [de-identified] : Plain films and a CT scan from [facility] reviewed.  There is a minimally displaced small posterior wall left acetabular fracture.

## 2024-08-14 NOTE — HISTORY OF PRESENT ILLNESS
[de-identified] : Patient is a pleasant 38-year-old gentleman who presents with his parents today for evaluation of left hip pain.  He jumped over a picket fence 2 days ago and landed awkwardly.  He noted immediate pain.  He was seen at [facility].  X-rays and a CT scan were obtained and a minimally displaced posterior wall acetabular fracture was diagnosed.  He is ambulating with crutches and toe-touch weightbearing today.  The patient states the pain is made worse with activity and relieved with rest.  [5] : a current pain level of 5/10

## 2025-02-04 NOTE — ED ADULT TRIAGE NOTE - LOCATION:
Notify patient that there is some mild swelling of the renal pelvis without any obvious hydronephrosis noted.  No obstructing stones are noted.  Small nonobstructing stones are noted in both kidneys.  Largest is approximately 2 mm in size.
Left arm;